# Patient Record
Sex: FEMALE | Race: WHITE | NOT HISPANIC OR LATINO | URBAN - METROPOLITAN AREA
[De-identification: names, ages, dates, MRNs, and addresses within clinical notes are randomized per-mention and may not be internally consistent; named-entity substitution may affect disease eponyms.]

---

## 2024-05-22 ENCOUNTER — OFFICE VISIT (OUTPATIENT)
Dept: URBAN - METROPOLITAN AREA CLINIC 70 | Facility: CLINIC | Age: 54
End: 2024-05-22
Payer: COMMERCIAL

## 2024-05-22 ENCOUNTER — LAB OUTSIDE AN ENCOUNTER (OUTPATIENT)
Dept: URBAN - METROPOLITAN AREA CLINIC 70 | Facility: CLINIC | Age: 54
End: 2024-05-22

## 2024-05-22 ENCOUNTER — DASHBOARD ENCOUNTERS (OUTPATIENT)
Age: 54
End: 2024-05-22

## 2024-05-22 VITALS
SYSTOLIC BLOOD PRESSURE: 127 MMHG | WEIGHT: 185.8 LBS | TEMPERATURE: 97.9 F | HEIGHT: 67 IN | BODY MASS INDEX: 29.16 KG/M2 | HEART RATE: 66 BPM | DIASTOLIC BLOOD PRESSURE: 83 MMHG

## 2024-05-22 DIAGNOSIS — K21.9 GASTROESOPHAGEAL REFLUX DISEASE, UNSPECIFIED WHETHER ESOPHAGITIS PRESENT: ICD-10-CM

## 2024-05-22 DIAGNOSIS — I25.10 CORONARY ARTERY DISEASE INVOLVING NATIVE HEART, UNSPECIFIED VESSEL OR LESION TYPE, UNSPECIFIED WHETHER ANGINA PRESENT: ICD-10-CM

## 2024-05-22 DIAGNOSIS — Z12.11 COLON CANCER SCREENING: ICD-10-CM

## 2024-05-22 PROBLEM — 53741008: Status: ACTIVE | Noted: 2024-05-22

## 2024-05-22 PROBLEM — 235595009: Status: ACTIVE | Noted: 2024-05-22

## 2024-05-22 PROCEDURE — 99204 OFFICE O/P NEW MOD 45 MIN: CPT | Performed by: INTERNAL MEDICINE

## 2024-05-22 RX ORDER — FAMOTIDINE 20 MG/1
1 TABLET AT BEDTIME AS NEEDED TABLET, FILM COATED ORAL ONCE A DAY
Status: ACTIVE | COMMUNITY

## 2024-05-22 RX ORDER — METRONIDAZOLE 375 MG/1
2 CAPSULES CAPSULE ORAL
Qty: 30 | Status: ACTIVE | COMMUNITY
Start: 2024-05-22 | End: 2024-05-27

## 2024-05-22 RX ORDER — PANTOPRAZOLE SODIUM 40 MG/1
1 TABLET TABLET, DELAYED RELEASE ORAL TWICE A DAY
Status: ACTIVE | COMMUNITY

## 2024-05-22 RX ORDER — FAMOTIDINE 20 MG/1
1 TABLET AT BEDTIME AS NEEDED TABLET, FILM COATED ORAL ONCE A DAY
OUTPATIENT

## 2024-05-22 RX ORDER — PANTOPRAZOLE SODIUM 40 MG/1
1 TABLET TABLET, DELAYED RELEASE ORAL TWICE A DAY
OUTPATIENT

## 2024-05-22 RX ORDER — ASPIRIN 81 MG/1
1 TABLET TABLET, COATED ORAL ONCE A DAY
Status: ACTIVE | COMMUNITY

## 2024-09-12 ENCOUNTER — TELEPHONE ENCOUNTER (OUTPATIENT)
Dept: URBAN - METROPOLITAN AREA CLINIC 70 | Facility: CLINIC | Age: 54
End: 2024-09-12

## 2024-10-23 ENCOUNTER — OFFICE VISIT (OUTPATIENT)
Dept: URBAN - METROPOLITAN AREA CLINIC 70 | Facility: CLINIC | Age: 54
End: 2024-10-23
Payer: COMMERCIAL

## 2024-10-23 ENCOUNTER — LAB OUTSIDE AN ENCOUNTER (OUTPATIENT)
Dept: URBAN - METROPOLITAN AREA CLINIC 70 | Facility: CLINIC | Age: 54
End: 2024-10-23

## 2024-10-23 VITALS
HEIGHT: 67 IN | BODY MASS INDEX: 29.88 KG/M2 | DIASTOLIC BLOOD PRESSURE: 75 MMHG | SYSTOLIC BLOOD PRESSURE: 132 MMHG | TEMPERATURE: 98.1 F | HEART RATE: 61 BPM | WEIGHT: 190.4 LBS

## 2024-10-23 DIAGNOSIS — K21.9 GASTROESOPHAGEAL REFLUX DISEASE, UNSPECIFIED WHETHER ESOPHAGITIS PRESENT: ICD-10-CM

## 2024-10-23 DIAGNOSIS — R13.19 ESOPHAGEAL DYSPHAGIA: ICD-10-CM

## 2024-10-23 DIAGNOSIS — Z12.11 SCREENING FOR COLON CANCER: ICD-10-CM

## 2024-10-23 PROBLEM — 40890009: Status: ACTIVE | Noted: 2024-10-23

## 2024-10-23 PROBLEM — 305058001: Status: ACTIVE | Noted: 2024-10-23

## 2024-10-23 PROCEDURE — 99213 OFFICE O/P EST LOW 20 MIN: CPT | Performed by: INTERNAL MEDICINE

## 2024-10-23 RX ORDER — PANTOPRAZOLE SODIUM 40 MG/1
1 TABLET TABLET, DELAYED RELEASE ORAL TWICE A DAY
Status: ACTIVE | COMMUNITY

## 2024-10-23 RX ORDER — FAMOTIDINE 20 MG/1
1 TABLET AT BEDTIME AS NEEDED TABLET, FILM COATED ORAL ONCE A DAY
Status: ACTIVE | COMMUNITY

## 2024-10-23 RX ORDER — ASPIRIN 81 MG/1
1 TABLET TABLET, COATED ORAL ONCE A DAY
Status: ACTIVE | COMMUNITY

## 2024-10-23 RX ORDER — CLOPIDOGREL 75 MG/1
1 TABLET TABLET, FILM COATED ORAL ONCE A DAY
Status: ACTIVE | COMMUNITY

## 2024-11-12 ENCOUNTER — OFFICE VISIT (OUTPATIENT)
Dept: URBAN - METROPOLITAN AREA MEDICAL CENTER 42 | Facility: MEDICAL CENTER | Age: 54
End: 2024-11-12
Payer: COMMERCIAL

## 2024-11-12 DIAGNOSIS — K31.89 REACTIVE GASTROPATHY: ICD-10-CM

## 2024-11-12 DIAGNOSIS — K21.9 ACID REFLUX: ICD-10-CM

## 2024-11-12 DIAGNOSIS — K29.50 ANTRAL GASTRITIS: ICD-10-CM

## 2024-11-12 PROCEDURE — 43239 EGD BIOPSY SINGLE/MULTIPLE: CPT | Performed by: INTERNAL MEDICINE

## 2024-11-21 ENCOUNTER — OFFICE VISIT (OUTPATIENT)
Dept: URBAN - METROPOLITAN AREA CLINIC 70 | Facility: CLINIC | Age: 54
End: 2024-11-21
Payer: COMMERCIAL

## 2024-11-21 VITALS
DIASTOLIC BLOOD PRESSURE: 81 MMHG | BODY MASS INDEX: 29.66 KG/M2 | SYSTOLIC BLOOD PRESSURE: 121 MMHG | WEIGHT: 189 LBS | HEIGHT: 67 IN | HEART RATE: 89 BPM | TEMPERATURE: 98.2 F

## 2024-11-21 DIAGNOSIS — Z12.11 SCREENING FOR COLON CANCER: ICD-10-CM

## 2024-11-21 DIAGNOSIS — K21.9 GASTROESOPHAGEAL REFLUX DISEASE, UNSPECIFIED WHETHER ESOPHAGITIS PRESENT: ICD-10-CM

## 2024-11-21 PROCEDURE — 99214 OFFICE O/P EST MOD 30 MIN: CPT | Performed by: REGISTERED NURSE

## 2024-11-21 RX ORDER — PANTOPRAZOLE SODIUM 40 MG/1
1 TABLET TABLET, DELAYED RELEASE ORAL TWICE A DAY
OUTPATIENT

## 2024-11-21 RX ORDER — PANTOPRAZOLE SODIUM 40 MG/1
1 TABLET TABLET, DELAYED RELEASE ORAL TWICE A DAY
Status: ACTIVE | COMMUNITY

## 2024-11-21 RX ORDER — CLOPIDOGREL 75 MG/1
1 TABLET TABLET, FILM COATED ORAL ONCE A DAY
Status: ACTIVE | COMMUNITY

## 2024-11-21 RX ORDER — ASPIRIN 81 MG/1
1 TABLET TABLET, COATED ORAL ONCE A DAY
Status: ACTIVE | COMMUNITY

## 2024-11-21 RX ORDER — FAMOTIDINE 40 MG/1
1 TABLET AT BEDTIME TABLET, FILM COATED ORAL ONCE A DAY
OUTPATIENT

## 2024-11-21 RX ORDER — FAMOTIDINE 20 MG/1
1 TABLET AT BEDTIME AS NEEDED TABLET, FILM COATED ORAL ONCE A DAY
Status: ACTIVE | COMMUNITY

## 2024-11-21 NOTE — HPI-OTHER HISTORIES
Note from OV 10/23/24 with KENNY Gottlieb MD: Patient presents for evaluation of GERD Hx of CAD, recent stenting in end of May 2024, on Aspirin and plavix Hx pertinent for sleeve gastrectomy and hiatal hernia repair in 2016 in [place] Reports heartburn and acid reflux and occasional sensation of food getting stuck in chest C/o intermittent vomiting Had same complaints at last visit, reports symptoms have not improved despite BID PPI Denies abdominal pain, melena, hematochezia, unintentional weight loss - - - - - - - - - - - - - - - - - - - - - -

## 2024-11-21 NOTE — HPI-TODAY'S VISIT:
EGD 11/12/24 showed a medium sized hiatal hernia and mild inactive chronic gastritis(no hpylori)  She reports persistent GERD symptoms. She frequently has to take Tums for breakthrough symptoms. She is taking pantoprazole 40mg and famotidine 20mg in the am and pantoprazole 40mg and famotidine 20mg at bedtime. She denies any dysphagia.

## 2025-01-21 ENCOUNTER — LAB OUTSIDE AN ENCOUNTER (OUTPATIENT)
Dept: URBAN - METROPOLITAN AREA CLINIC 70 | Facility: CLINIC | Age: 55
End: 2025-01-21

## 2025-01-21 ENCOUNTER — OFFICE VISIT (OUTPATIENT)
Dept: URBAN - METROPOLITAN AREA CLINIC 70 | Facility: CLINIC | Age: 55
End: 2025-01-21
Payer: COMMERCIAL

## 2025-01-21 VITALS
SYSTOLIC BLOOD PRESSURE: 122 MMHG | OXYGEN SATURATION: 98 % | RESPIRATION RATE: 18 BRPM | TEMPERATURE: 98.2 F | HEART RATE: 65 BPM | HEIGHT: 67 IN | WEIGHT: 190.8 LBS | BODY MASS INDEX: 29.95 KG/M2 | DIASTOLIC BLOOD PRESSURE: 80 MMHG

## 2025-01-21 DIAGNOSIS — Z12.11 SCREENING FOR COLON CANCER: ICD-10-CM

## 2025-01-21 DIAGNOSIS — K21.9 GASTROESOPHAGEAL REFLUX DISEASE, UNSPECIFIED WHETHER ESOPHAGITIS PRESENT: ICD-10-CM

## 2025-01-21 PROCEDURE — 99214 OFFICE O/P EST MOD 30 MIN: CPT | Performed by: INTERNAL MEDICINE

## 2025-01-21 RX ORDER — ASPIRIN 81 MG/1
1 TABLET TABLET, COATED ORAL ONCE A DAY
Status: ACTIVE | COMMUNITY

## 2025-01-21 RX ORDER — CLOPIDOGREL 75 MG/1
1 TABLET TABLET, FILM COATED ORAL ONCE A DAY
Status: ACTIVE | COMMUNITY

## 2025-01-21 RX ORDER — PANTOPRAZOLE SODIUM 40 MG/1
1 TABLET TABLET, DELAYED RELEASE ORAL TWICE A DAY
OUTPATIENT

## 2025-01-21 RX ORDER — VONOPRAZAN FUMARATE 26.72 MG/1
1 TABLET TABLET ORAL ONCE A DAY
Qty: 30 | Refills: 1 | OUTPATIENT
Start: 2025-01-21 | End: 2025-03-22

## 2025-01-21 RX ORDER — FAMOTIDINE 40 MG/1
1 TABLET AT BEDTIME TABLET, FILM COATED ORAL ONCE A DAY
Status: ACTIVE | COMMUNITY

## 2025-01-21 RX ORDER — FAMOTIDINE 40 MG/1
1 TABLET AT BEDTIME TABLET, FILM COATED ORAL ONCE A DAY
OUTPATIENT

## 2025-01-21 RX ORDER — PANTOPRAZOLE SODIUM 40 MG/1
1 TABLET TABLET, DELAYED RELEASE ORAL TWICE A DAY
Status: ACTIVE | COMMUNITY

## 2025-01-21 NOTE — HPI-TODAY'S VISIT:
54-year-old female here for follow-up.  Previously seen with complaint of GERD.  Past medical history significant for but not limited to coronary artery disease status post stenting May on aspirin and Plavix, history of sleeve gastrectomy and hernia repair in 2016 in [place].At last visit this patient was seen by nurse practitioner Karla Saleem in the clinic and was started on twice daily H2 blocker.  And twice daily PPI was continued.  At today's visit, patient reports that she continues to experience heartburn symptoms despite twice daily PPI and taking twice daily H2 blocker.  No other complaints from gastrointestinal standpoint.  EGD 11/12/2024: Z-line regular, normal esophagus.  Small to medium size hiatal hernia present.  Mild inflammation found in the gastric antrum, biopsies taken using cold biopsy forcep.  Mild inflammation was found at the incisura, biopsies were taken using cold biopsy forcep.  Diffuse thining of gastric mucosa with increased visibility of vasculature was noted in the gastric body, concerning for possible atrophic gastritis.  Biopsied.  Small mucosal nodule present in the gastric body biopsies were taken using cold biopsy forcep.  Dilated lacteals in the duodenal bulb, biopsy.  Normal second part of duodenum.  Pathology: Normal duodenal biopsy.  Stomach antrum showed gastric mucosa with reactive gastropathy and mild chronic gastritis, intestinal metaplasia is present.  Stomach body biopsy showed mild inactive chronic gastritis.  Stomach nodule biopsy showed gastric mucosa with mild inactive chronic gastritis.  Gastric incisura biopsy showed gastric mucosa with mild inactive chronic gastritis.

## 2025-02-10 ENCOUNTER — TELEPHONE ENCOUNTER (OUTPATIENT)
Dept: URBAN - METROPOLITAN AREA CLINIC 70 | Facility: CLINIC | Age: 55
End: 2025-02-10

## 2025-03-03 ENCOUNTER — TELEPHONE ENCOUNTER (OUTPATIENT)
Dept: URBAN - METROPOLITAN AREA CLINIC 70 | Facility: CLINIC | Age: 55
End: 2025-03-03

## 2025-03-17 ENCOUNTER — TELEPHONE ENCOUNTER (OUTPATIENT)
Dept: URBAN - METROPOLITAN AREA CLINIC 70 | Facility: CLINIC | Age: 55
End: 2025-03-17

## 2025-03-17 RX ORDER — ONDANSETRON HYDROCHLORIDE 4 MG/1
1 TABLET TABLET, FILM COATED ORAL
Qty: 6 | Refills: 0 | OUTPATIENT
Start: 2025-03-17

## 2025-03-18 ENCOUNTER — OFFICE VISIT (OUTPATIENT)
Dept: URBAN - METROPOLITAN AREA MEDICAL CENTER 42 | Facility: MEDICAL CENTER | Age: 55
End: 2025-03-18
Payer: COMMERCIAL

## 2025-03-18 DIAGNOSIS — Z12.11 COLON CANCER SCREENING: ICD-10-CM

## 2025-03-18 DIAGNOSIS — Z53.8 FAILED ATTEMPTED SURGICAL PROCEDURE: ICD-10-CM

## 2025-03-18 PROCEDURE — G0121 COLON CA SCRN NOT HI RSK IND: HCPCS | Performed by: INTERNAL MEDICINE
